# Patient Record
Sex: FEMALE | Race: WHITE | Employment: OTHER | URBAN - METROPOLITAN AREA
[De-identification: names, ages, dates, MRNs, and addresses within clinical notes are randomized per-mention and may not be internally consistent; named-entity substitution may affect disease eponyms.]

---

## 2018-07-14 ENCOUNTER — HOSPITAL ENCOUNTER (OUTPATIENT)
Age: 58
Discharge: HOME | End: 2018-07-14
Attending: FAMILY MEDICINE
Payer: COMMERCIAL

## 2018-07-14 VITALS
TEMPERATURE: 98 F | HEIGHT: 67.5 IN | SYSTOLIC BLOOD PRESSURE: 136 MMHG | RESPIRATION RATE: 16 BRPM | OXYGEN SATURATION: 99 % | DIASTOLIC BLOOD PRESSURE: 83 MMHG | BODY MASS INDEX: 20.17 KG/M2 | HEART RATE: 76 BPM | WEIGHT: 130 LBS

## 2018-07-14 DIAGNOSIS — R04.0 ACUTE ANTERIOR EPISTAXIS: Primary | ICD-10-CM

## 2018-07-14 PROCEDURE — 30901 CONTROL OF NOSEBLEED: CPT

## 2018-07-14 PROCEDURE — 99202 OFFICE O/P NEW SF 15 MIN: CPT

## 2018-07-14 RX ORDER — AZELASTINE 1 MG/ML
SPRAY, METERED NASAL AS NEEDED
COMMUNITY

## 2018-07-14 RX ORDER — FLUTICASONE PROPIONATE 50 MCG
SPRAY, SUSPENSION (ML) NASAL AS NEEDED
COMMUNITY

## 2018-07-14 NOTE — ED INITIAL ASSESSMENT (HPI)
The patient is here with complaints of a nose bleed that started about 420pm and won't stop. She states she has had 6 nose in the last 6 weeks. She states it is always to the right nares and this time isn't stopping.   The previous ones she has been able

## 2018-07-14 NOTE — ED NOTES
Nose clamp applied to patient's nose in attempts to stop/slow bleeding from the right nares. Patient was also provided with multiple hand/wash towels to hold at the base of the nose.

## 2018-07-14 NOTE — ED PROVIDER NOTES
Patient Seen in: [facility]    History   Patient presents with:  Nose Bleed (nasopharyngeal)    Stated Complaint: NOSE BLEED    HPI    15-year-old female with history allergic rhinitis has been taken nasal saline spray and Right TM with active bleeding. Unable to evaluate source of the bleed. Mouth: Blood clot noted at the posterior pharynx. Neuro: Alert and oriented ×3 sensation intact. No focal deficits.   Normal gait  Vascular: Peripheral pulses intact        ED Course